# Patient Record
Sex: MALE | Race: WHITE | NOT HISPANIC OR LATINO | URBAN - METROPOLITAN AREA
[De-identification: names, ages, dates, MRNs, and addresses within clinical notes are randomized per-mention and may not be internally consistent; named-entity substitution may affect disease eponyms.]

---

## 2018-08-27 ENCOUNTER — OFFICE VISIT CONVERTED (OUTPATIENT)
Dept: GASTROENTEROLOGY | Facility: CLINIC | Age: 33
End: 2018-08-27
Attending: NURSE PRACTITIONER

## 2020-03-07 ENCOUNTER — HOSPITAL ENCOUNTER (OUTPATIENT)
Dept: OTHER | Facility: HOSPITAL | Age: 35
Discharge: HOME | End: 2020-03-07
Attending: NURSE PRACTITIONER

## 2020-03-07 LAB
CHARACTER SMN: ABNORMAL
COLOR SMN: ABNORMAL
COMPLETE EJACULATE: YES
CONV COLLECTION METHOD: ABNORMAL
Lab: ABNORMAL
Lab: NO
MORPHOLOGY: ABNORMAL
PH UR: 8.5 [PH]
SEX ABSTIN DURATION TIME PATIENT: 5 D
SPECIMEN VOL SMN: 3.5 ML (ref 1.5–5)
SPERM # SMN: 41.6 10*6/ML
SPERM MOTILE NFR SMN: ABNORMAL %
TRANSPORT ISSUES: NO
VISC SMN: ABNORMAL CP

## 2020-08-10 ENCOUNTER — HOSPITAL ENCOUNTER (OUTPATIENT)
Dept: URGENT CARE | Facility: CLINIC | Age: 35
Discharge: HOME | End: 2020-08-10
Attending: PHYSICIAN ASSISTANT

## 2020-08-11 ENCOUNTER — OFFICE VISIT CONVERTED (OUTPATIENT)
Dept: UROLOGY | Facility: CLINIC | Age: 35
End: 2020-08-11
Attending: NURSE PRACTITIONER

## 2020-08-11 ENCOUNTER — HOSPITAL ENCOUNTER (OUTPATIENT)
Dept: SURGERY | Facility: CLINIC | Age: 35
Discharge: HOME | End: 2020-08-11
Attending: NURSE PRACTITIONER

## 2020-08-13 LAB — BACTERIA UR CULT: NORMAL

## 2020-08-14 ENCOUNTER — HOSPITAL ENCOUNTER (OUTPATIENT)
Dept: CT IMAGING | Facility: HOSPITAL | Age: 35
Discharge: HOME | End: 2020-08-14
Attending: NURSE PRACTITIONER

## 2020-08-18 ENCOUNTER — TELEPHONE CONVERTED (OUTPATIENT)
Dept: UROLOGY | Facility: CLINIC | Age: 35
End: 2020-08-18
Attending: NURSE PRACTITIONER

## 2020-09-01 ENCOUNTER — HOSPITAL ENCOUNTER (OUTPATIENT)
Dept: UROLOGY | Facility: CLINIC | Age: 35
Discharge: HOME | End: 2020-09-01
Attending: NURSE PRACTITIONER

## 2020-09-17 LAB
COLOR STONE: NORMAL
COMPN STONE: NORMAL
CONV CA OXALATE DIHYDRATE: 44 %
CONV CA OXALATE MONOHYDRATE: 56 %
CONV CALCULI COMMENT: NORMAL
CONV CALCULI COMMENT: NORMAL
CONV CALCULI DISCLAIMER: NORMAL
CONV CALCULI NOTE: NORMAL
CONV PHOTO (CALCULI): NORMAL
SIZE STONE: NORMAL MM
WT STONE: 19 MG

## 2020-09-19 ENCOUNTER — HOSPITAL ENCOUNTER (OUTPATIENT)
Dept: URGENT CARE | Facility: CLINIC | Age: 35
Discharge: HOME | End: 2020-09-19
Attending: NURSE PRACTITIONER

## 2020-09-19 LAB
ALBUMIN SERPL-MCNC: 4.7 G/DL (ref 3.5–5)
ALBUMIN/GLOB SERPL: 1.9 {RATIO} (ref 1.4–2.6)
ALP SERPL-CCNC: 86 U/L (ref 53–128)
ALT SERPL-CCNC: 20 U/L (ref 10–40)
ANION GAP SERPL CALC-SCNC: 14 MMOL/L (ref 8–19)
AST SERPL-CCNC: 23 U/L (ref 15–50)
BASOPHILS # BLD AUTO: 0.03 10*3/UL (ref 0–0.2)
BASOPHILS NFR BLD AUTO: 0.5 % (ref 0–3)
BILIRUB SERPL-MCNC: 0.69 MG/DL (ref 0.2–1.3)
BUN SERPL-MCNC: 12 MG/DL (ref 5–25)
BUN/CREAT SERPL: 14 {RATIO} (ref 6–20)
CALCIUM SERPL-MCNC: 9.7 MG/DL (ref 8.7–10.4)
CHLORIDE SERPL-SCNC: 102 MMOL/L (ref 99–111)
CONV ABS IMM GRAN: 0.03 10*3/UL (ref 0–0.2)
CONV CO2: 27 MMOL/L (ref 22–32)
CONV IMMATURE GRAN: 0.5 % (ref 0–1.8)
CONV TOTAL PROTEIN: 7.2 G/DL (ref 6.3–8.2)
CREAT UR-MCNC: 0.85 MG/DL (ref 0.7–1.2)
DEPRECATED RDW RBC AUTO: 42 FL (ref 35.1–43.9)
EOSINOPHIL # BLD AUTO: 0.07 10*3/UL (ref 0–0.7)
EOSINOPHIL # BLD AUTO: 1.2 % (ref 0–7)
ERYTHROCYTE [DISTWIDTH] IN BLOOD BY AUTOMATED COUNT: 13 % (ref 11.6–14.4)
GFR SERPLBLD BASED ON 1.73 SQ M-ARVRAT: >60 ML/MIN/{1.73_M2}
GLOBULIN UR ELPH-MCNC: 2.5 G/DL (ref 2–3.5)
GLUCOSE SERPL-MCNC: 87 MG/DL (ref 70–99)
HCT VFR BLD AUTO: 44.6 % (ref 42–52)
HGB BLD-MCNC: 15 G/DL (ref 14–18)
LYMPHOCYTES # BLD AUTO: 2.46 10*3/UL (ref 1–5)
LYMPHOCYTES NFR BLD AUTO: 40.9 % (ref 20–45)
MCH RBC QN AUTO: 29.8 PG (ref 27–31)
MCHC RBC AUTO-ENTMCNC: 33.6 G/DL (ref 33–37)
MCV RBC AUTO: 88.7 FL (ref 80–96)
MONOCYTES # BLD AUTO: 0.44 10*3/UL (ref 0.2–1.2)
MONOCYTES NFR BLD AUTO: 7.3 % (ref 3–10)
NEUTROPHILS # BLD AUTO: 2.99 10*3/UL (ref 2–8)
NEUTROPHILS NFR BLD AUTO: 49.6 % (ref 30–85)
NRBC CBCN: 0 % (ref 0–0.7)
OSMOLALITY SERPL CALC.SUM OF ELEC: 287 MOSM/KG (ref 273–304)
PLATELET # BLD AUTO: 189 10*3/UL (ref 130–400)
PMV BLD AUTO: 10.3 FL (ref 9.4–12.4)
POTASSIUM SERPL-SCNC: 4.1 MMOL/L (ref 3.5–5.3)
RBC # BLD AUTO: 5.03 10*6/UL (ref 4.7–6.1)
SODIUM SERPL-SCNC: 139 MMOL/L (ref 135–147)
WBC # BLD AUTO: 6.02 10*3/UL (ref 4.8–10.8)

## 2020-09-21 ENCOUNTER — OFFICE VISIT CONVERTED (OUTPATIENT)
Dept: UROLOGY | Facility: CLINIC | Age: 35
End: 2020-09-21
Attending: NURSE PRACTITIONER

## 2020-09-21 ENCOUNTER — CONVERSION ENCOUNTER (OUTPATIENT)
Dept: SURGERY | Facility: CLINIC | Age: 35
End: 2020-09-21

## 2020-09-24 ENCOUNTER — PROCEDURE VISIT CONVERTED (OUTPATIENT)
Dept: UROLOGY | Facility: CLINIC | Age: 35
End: 2020-09-24
Attending: UROLOGY

## 2021-01-17 ENCOUNTER — HOSPITAL ENCOUNTER (OUTPATIENT)
Dept: URGENT CARE | Facility: CLINIC | Age: 36
Discharge: HOME | End: 2021-01-17

## 2021-01-19 LAB — BACTERIA SPEC AEROBE CULT: NORMAL

## 2021-01-20 LAB — SARS-COV-2 RNA SPEC QL NAA+PROBE: DETECTED

## 2021-05-10 NOTE — H&P
"   History and Physical      Patient Name: Ernesto Ward   Patient ID: [id number]   Sex: Male   YOB: 1985        Visit Date: August 11, 2020    Provider: SERGEY Jenkins   Location[facility]   Location Address: [address] [place] [address]  [id number]   Location Phone: [phone]          Chief Complaint  · \"I may have blood in my urine\"      History Of Present Illness  The patient is a 35 year old /White male, who is a consultation from [facility], for the evaluation of an episode of gross hematuria. He noticed blood in his urine 2 days ago. The hematuria was constant throughout the stream. It was not associated with any specific activity.     He states the color of his urine during an episode is light red. The patient has no additional complaints. He denies frequency, urgency, dysuria, back pain, fever, chills, nausea, vomiting, and weight loss.     He states that there is no history of recent abdominal or flank trauma. The patient's past medical history is notable for hematuria.     The patient has not been previously evaluated for hematuria.      The patient reports that this occurred when he was 18 and he had a full workup with CT and cystoscopy.           Past Medical History  Disease Name Date Onset Notes   Kidney calculi --  --          Past Surgical History  Procedure Name Date Notes   Tonsils and adenoids biopsy --  --    [place] Tooth Extraction --  --          Allergy List  Allergen Name Date Reaction Notes   NO KNOWN DRUG ALLERGIES --  --  --          Family Medical History  Disease Name Relative/Age Notes   *No Known Family History  --          Social History  Finding Status Start/Stop Quantity Notes   Alcohol Never --/-- --  --    Tobacco Never --/-- --  --          Review of Systems  · Constitutional  o Denies  o : fever, chills  · Eyes  o Denies  o : double vision, cataracts  · HENT  o Denies  o : hearing loss, " "headaches  · Cardiovascular  o Denies  o : chest pain at rest, chest pain with exercise, irregular heart beats, palpitations, leg cramps with exercise  · Respiratory  o Denies  o : shortness of breath, wheezing, sleep apnea  · Gastrointestinal  o Denies  o : heartburn or indigestion, nausea or vomiting, change in abdominal girth, diarrhea, constipation, blood in stools  · Genitourinary  o Admits  o : hematuria  o Denies  o : urgency, frequency, dysuria  · Integument  o Denies  o : rash, new skin lesions  · Neurologic  o Denies  o : memory difficulties, headache, mini-strokes, seizures  · Endocrine  o Denies  o : hot flashes, thyroid disorders  · Psychiatric  o Denies  o : depression, schizophrenia, bipolar disorder  · Heme-Lymph  o Denies  o : easy bleeding, easy bruising, sickle cell disease or trait, lymph node enlargement or tenderness  · Allergic-Immunologic  o Denies  o : immune deficiency, HIV, Hepatitis C      Vitals  Date Time BP Position Site L\R Cuff Size HR RR TEMP (F) WT  HT  BMI kg/m2 BSA m2 O2 Sat        08/11/2020 01:39 PM       15  131lbs 4oz 5'  7\" 20.56 1.68           Physical Examination  · Constitutional  o Appearance  o : Well nourished, well developed patient in no acute distress. Ambulating without difficulty.  · Head and Face  o Head  o :   § Inspection  § : atraumatic, normocephalic  o Face  o :   § Inspection  § : no facial lesions  · Eyes  o Sclerae  o : sclerae white  · Ears, Nose, Mouth and Throat  o Ears  o :   § External Ears  § : appearance within normal limits, no lesions present  o Nose  o :   § External Nose  § : appearance normal  · Neck  o Inspection/Palpation  o : normal appearance, trachea midline  · Respiratory  o Respiratory Effort  o : Breathing is unlabored without accessory muscle use  o Inspection of Chest  o : normal appearance, no retractions  · Skin and Subcutaneous Tissue  o General Inspection  o : No rashes, lesions or areas of discoloration present. Skin turgor is " normal.  o General Palpation  o : No abnormalities, masses or tenderness on palpation.  · Neurologic  o Mental Status Examination  o :   § Orientation  § : grossly oriented to person, place and time  § Speech/Language  § : communication ability within normal limits  o Gait and Station  o : normal gait, able to stand without difficulty  · Psychiatric  o Judgement and Insight  o : judgment and insight intact, judgement for everyday activities and social situations within normal limits, insight intact  o Mood and Affect  o : mood normal, affect appropriate          Results  · In-Office Procedures  o Lab procedure  § Automated dipstick urinalysis with microscopy ([id number])   § Color Ur: Dark yellow   § Clarity Ur: Cloudy   § Glucose Ur Ql Strip: Negative   § Bilirub Ur Ql Strip: Negative   § Ketones Ur Ql Strip: Negative   § Sp Gr Ur Qn: 1.025   § Hgb Ur Ql Strip: Large   § pH Ur-LsCnc: 5.5   § Prot Ur Ql Strip: 30   § Urobilinogen Ur Strip-mCnc: 0.2   § Nitrite Ur Ql Strip: Negative   § WBC Est Ur Ql Strip: Negative   § RBC UrnS Qn HPF: 10-20   § WBC UrnS Qn HPF: 0   § Bacteria UrnS Qn HPF: tntc   § Crystals UrnS Qn HPF: 0   § Epithelial Cells (non renal): 0 /HPF  § Epithelial Cells (renal): 0       Assessment  · Gross hematuria     599.71/R31.0      Plan  · Orders  o Urine culture ([id number], 42882, 13207, 62659) - 599.71/R31.0 - 08/11/2020  o CT Abdomen and Pelvis (with and without Contrast) with Bosniak Class and delayed images ([id number]-[id number]) - 599.71/R31.0 - 08/11/2020  · Medications  o Medications have been Reconciled  o Transition of Care or Provider Policy  · Instructions  o DISCUSSION:  o The patient has documented hematuria. I have discussed the etiologies of hematuria and the options for management and treatment. I have outlined my recommendation for this problem. The patient is in agreement with the plans. The patient is aware that if the workup is not completed, there is a chance that a malignancy may go undetected  and may lead to morbidity or mortality.  o PLAN:  o Schedule cystoscopy  o CT abdomen/pelvis without and with IV contrast            Electronically Signed by: SERGEY Jenkins -Author on August 11, 2020 02:20:25 PM

## 2021-05-13 NOTE — PROGRESS NOTES
"   Quick Note      Patient Name: Ernesto Ward   Patient ID: [id number]   Sex: Male   YOB: 1985        Visit Date: August 18, 2020    Provider: SERGEY Jenkins   Location[facility]   Location Address: [address] [place] [address]  [id number]   Location Phone: [phone]          History Of Present Illness  TELEHEALTH TELEPHONE VISIT  Chief Complaint: CHIEF COMPLAINT   Ernesto Ward is a 35 year old /White male who is presenting for evaluation via telehealth telephone visit. Verbal consent obtained before beginning visit.   Provider spent 8 minutes with the patient during the telehealth visit.   The following staff were present during this visit: Lillie Christianson MA, Felipa RINCON      Called patient to go over results of CT scan.  His CT scan revealed a 3 mm stone in his right distal ureter.    The patient states he has been taking tamsulosin 0.4 mg daily since 4 days ago and started experiencing some mild flank pain last evening.    Educated patient that I do believe this is where his gross hematuria is originating from.    Educated the patient that he will need to strain all urine and bring in his stone for analysis when he passes it.         Vitals  Date Time BP Position Site L\R Cuff Size HR RR TEMP (F) WT  HT  BMI kg/m2 BSA m2 O2 Sat HC       08/18/2020 11:35 AM         130lbs 0oz 5'  6\" 20.98 1.66               Assessment  · Right ureteral calculus     592.1/N20.1      Plan  · Orders  o [facility]ican Telephone evaluation, 5-10 min ([id number]) - 592.1/N20.1 - 08/18/2020  · Instructions  o Plan Of Care: Strain all urine. Bring in past stone for analysis. Patient to follow-up via telephone in approximately 5 weeks. Will evaluate symptoms at that point in time and go over stone pathology should he be able to bring in his renal stone.            Electronically Signed by: SERGEY Jenkins -Author on August 18, 2020 02:38:40 PM  "

## 2021-05-13 NOTE — PROGRESS NOTES
"   Progress Note      Patient Name: Ernesto Ward   Patient ID: [id number]   Sex: Male   YOB: 1985        Visit Date: September 21, 2020    Provider: SERGEY Jenkins   Location: [facility]   Location Address: [address] [place] [address]  [id number]   Location Phone: [phone]          Chief Complaint  · pt here for urologic concerns      History Of Present Illness     35-year-old  male patient presents for follow-up.    Patient's wife had called 9/14/2020 and stated that the patient was complaining of dark-colored urine and thought that he may be passing a stone again.    The patient did not present to an urgent care center when his wife initially called although waited until the 19th of this month to be seen at the urgent care center.    The patient had blood drawn for a CBC and a CMP which were both unremarkable.  However, no urine studies were performed at that point time.    The patient has moderate amount of blood in his urine on urine microscopy today.           Past Medical History  Disease Name Date Onset Notes   Kidney calculi --  --          Past Surgical History  Procedure Name Date Notes   Tonsils and adenoids biopsy --  --    [place] Tooth Extraction --  --          Allergy List  Allergen Name Date Reaction Notes   NO KNOWN DRUG ALLERGIES --  --  --          Family Medical History  Disease Name Relative/Age Notes   *No Known Family History  --          Social History  Finding Status Start/Stop Quantity Notes   Alcohol Never --/-- --  --    Tobacco Never --/-- --  --          Review of Systems  · Constitutional  o Denies  o : fever, chills  · Gastrointestinal  o Denies  o : nausea, vomiting  · Genitourinary  o Admits  o : hematuria  o Denies  o : dysuria      Vitals  Date Time BP Position Site L\R Cuff Size HR RR TEMP (F) WT  HT  BMI kg/m2 BSA m2 O2 Sat HC       09/21/2020 09:03 AM       12  132lbs 2oz 5'  6\" 21.33 1.67           Physical " Examination  · Constitutional  o Appearance  o : well-nourished, well developed, alert, in no acute distress  · Head and Face  o Head  o :   § Inspection  § : atraumatic, normocephalic  o Face  o :   § Inspection  § : no facial lesions  · Eyes  o Sclerae  o : sclerae white  · Ears, Nose, Mouth and Throat  o Ears  o :   § External Ears  § : appearance within normal limits, no lesions present  o Nose  o :   § External Nose  § : appearance normal  · Neck  o Inspection/Palpation  o : normal appearance, trachea midline  · Respiratory  o Respiratory Effort  o : breathing unlabored  o Inspection of Chest  o : normal appearance, no retractions  · Skin and Subcutaneous Tissue  o General Inspection  o : no rashes or lesions present, no lesions present, no areas of discoloration  · Neurologic  o Mental Status Examination  o :   § Orientation  § : grossly oriented to person, place and time  § Speech/Language  § : communication ability within normal limits  o Gait and Station  o : normal gait, able to stand without difficulty  · Psychiatric  o Judgement and Insight  o : judgment and insight intact, judgement for everyday activities and social situations within normal limits, insight intact  o Mood and Affect  o : mood normal, affect appropriate          Results  · In-Office Procedures  o Lab procedure  § Automated dipstick urinalysis with microscopy ([id number])   § Color Ur: Yellow   § Clarity Ur: Clear   § Glucose Ur Ql Strip: Negative   § Bilirub Ur Ql Strip: Negative   § Ketones Ur Ql Strip: Negative   § Sp Gr Ur Qn: 1.025   § Hgb Ur Ql Strip: Moderate   § pH Ur-LsCnc: 5.5   § Prot Ur Ql Strip: Negative   § Urobilinogen Ur Strip-mCnc: 0.2 E.U./dL   § Nitrite Ur Ql Strip: Negative   § WBC Est Ur Ql Strip: Negative   § RBC UrnS Qn HPF: 10-20   § WBC UrnS Qn HPF: 0   § Bacteria UrnS Qn HPF: 0   § Crystals UrnS Qn HPF: 0   § Epithelial Cells (non renal): 0 /HPF  § Epithelial Cells (renal): 0       Assessment  · Gross  hematuria     599.71/R31.0      Plan  · Medications  o Medications have been Reconciled  o Transition of Care or Provider Policy  · Instructions  o Patient to call office if any new or worsening hematuria. He will need to follow-up with Dr. Thuy Gutiérrez for a cystoscopy in office to complete his hematuria workup. Educated patient that I believe that his hematuria is related to passing another kidney stone especially since he has continued microscopic hematuria today. Follow-up with patient in 3 months.  o Electronically Identified Patient Education Materials Provided Electronically            Electronically Signed by: SERGEY Jenkins -Author on September 21, 2020 09:59:14 AM

## 2021-05-14 VITALS — BODY MASS INDEX: 21.23 KG/M2 | WEIGHT: 132.12 LBS | RESPIRATION RATE: 12 BRPM | HEIGHT: 66 IN

## 2021-05-14 VITALS — HEIGHT: 66 IN | WEIGHT: 130 LBS | BODY MASS INDEX: 20.89 KG/M2

## 2021-05-15 VITALS — BODY MASS INDEX: 20.6 KG/M2 | WEIGHT: 131.25 LBS | RESPIRATION RATE: 15 BRPM | HEIGHT: 67 IN

## 2021-05-16 VITALS
DIASTOLIC BLOOD PRESSURE: 70 MMHG | BODY MASS INDEX: 20.09 KG/M2 | SYSTOLIC BLOOD PRESSURE: 114 MMHG | WEIGHT: 125 LBS | TEMPERATURE: 98.8 F | HEIGHT: 66 IN | HEART RATE: 72 BPM

## 2022-04-22 DIAGNOSIS — N20.0 RENAL STONES: Primary | ICD-10-CM

## 2022-05-13 ENCOUNTER — HOSPITAL ENCOUNTER (OUTPATIENT)
Dept: CT IMAGING | Facility: HOSPITAL | Age: 37
Discharge: HOME | End: 2022-05-13
Admitting: UROLOGY

## 2022-05-13 DIAGNOSIS — N20.0 RENAL STONES: ICD-10-CM

## 2022-05-13 PROCEDURE — 74176 CT ABD & PELVIS W/O CONTRAST: CPT

## 2022-05-16 NOTE — PROGRESS NOTES
Please call the patient regarding his CT scan.  He has a few small renal stones on each side but all are small enough that he should pass them.  The largest is 4 mm on right and 3 mm on left.  He is not passing any stones currently.

## 2023-06-04 PROCEDURE — 87086 URINE CULTURE/COLONY COUNT: CPT

## 2023-08-16 ENCOUNTER — TELEPHONE (OUTPATIENT)
Dept: UROLOGY | Facility: CLINIC | Age: 38
End: 2023-08-16
Payer: COMMERCIAL

## 2023-08-16 DIAGNOSIS — R31.0 GROSS HEMATURIA: Primary | ICD-10-CM

## 2023-08-16 NOTE — TELEPHONE ENCOUNTER
Called and spoke with wife and she stated that he is having flank pain but not a whole lot also no nausea vomiting no fever chills flank pain comes and goes.  Does have dark colored urine, patient wife stated that he is a  and can give a urine specimen on Monday.  Dr cervantes stated to obtain a uacx, to see if with an infection then can get a ct scan renal protocol.  Wife stated understanding.

## 2023-08-16 NOTE — TELEPHONE ENCOUNTER
Caller: DEANN HAWTHORNE     Relationship: SPOUSE    Best call back number: [phone]    Patient is needing: INCOMING CALL FROM PT'S SPOUSE. ASKING TO GET PT IN TO BE SEEN ON MONDAY OR SOMETIME NEXT WEEK. SAYS THAT HE IS PEEING BLOOD AGAIN AND IT IS WORSENING. PT SPOUSE SAYS HE HAD BLOOD IN HIS URINE 2 WEEKS AGO, SAID IT GOT BETTER, BUT THEN MORE BLOOD HAS SHOWED UP SINCE LAST NIGHT, SAYS IT LOOKS LIKE COKE SODA WATER.

## 2023-08-18 ENCOUNTER — LAB (OUTPATIENT)
Dept: LAB | Facility: HOSPITAL | Age: 38
End: 2023-08-18
Payer: COMMERCIAL

## 2023-08-18 DIAGNOSIS — R31.0 GROSS HEMATURIA: ICD-10-CM

## 2023-08-18 PROCEDURE — 87086 URINE CULTURE/COLONY COUNT: CPT

## 2023-08-18 PROCEDURE — 81001 URINALYSIS AUTO W/SCOPE: CPT

## 2023-08-19 LAB
BACTERIA UR QL AUTO: ABNORMAL /HPF
BILIRUB UR QL STRIP: NEGATIVE
CLARITY UR: CLEAR
COLOR UR: YELLOW
GLUCOSE UR STRIP-MCNC: NEGATIVE MG/DL
HGB UR QL STRIP.AUTO: ABNORMAL
HYALINE CASTS UR QL AUTO: ABNORMAL /LPF
KETONES UR QL STRIP: ABNORMAL
LEUKOCYTE ESTERASE UR QL STRIP.AUTO: NEGATIVE
NITRITE UR QL STRIP: NEGATIVE
PH UR STRIP.AUTO: 6.5 [PH] (ref 5–8)
PROT UR QL STRIP: NEGATIVE
RBC # UR STRIP: ABNORMAL /HPF
REF LAB TEST METHOD: ABNORMAL
SP GR UR STRIP: 1.02 (ref 1–1.03)
SQUAMOUS #/AREA URNS HPF: ABNORMAL /HPF
UROBILINOGEN UR QL STRIP: ABNORMAL
WBC # UR STRIP: ABNORMAL /HPF

## 2023-08-20 LAB — BACTERIA SPEC AEROBE CULT: NO GROWTH

## 2023-08-28 ENCOUNTER — SPECIMEN COLLECTION (OUTPATIENT)
Dept: UROLOGY | Facility: CLINIC | Age: 38
End: 2023-08-28
Payer: COMMERCIAL

## 2023-08-28 ENCOUNTER — TELEPHONE (OUTPATIENT)
Dept: UROLOGY | Facility: CLINIC | Age: 38
End: 2023-08-28
Payer: COMMERCIAL

## 2023-08-28 DIAGNOSIS — N20.0 RENAL STONES: Primary | ICD-10-CM

## 2023-08-28 DIAGNOSIS — N20.0 RENAL STONES: ICD-10-CM

## 2023-08-28 PROCEDURE — 88300 SURGICAL PATH GROSS: CPT | Performed by: UROLOGY

## 2023-08-28 PROCEDURE — 82365 CALCULUS SPECTROSCOPY: CPT | Performed by: UROLOGY

## 2023-08-28 NOTE — TELEPHONE ENCOUNTER
----- Message from Thuy Gutiérrez MD sent at 8/26/2023  3:23 PM EDT -----  Urine culture negative; CT scan without acute findings; can put in for cysto and go over CT result; thanks

## 2023-08-28 NOTE — TELEPHONE ENCOUNTER
SPOKE TO PT AND RELAYED MESSAGE. OFFERED APPT FOR 9/1 FOR CYSTO, AND TO DISCUSS CT. PT DECLINED. SAID THAT HE PASSED A KIDNEY STONE OVER THE WEEKEND AND HE IS FEELING BETTER. HE DID COLLECT THE STONE AND WOULD LIKE TO HAVE IT SENT OFF FOR ANALYSIS. ADVISED PT TO BRING STONE IN AND WE WILL SEND IT TO THE LAB. PT EXPRESSED UNDERSTANDING AND IS AGREEABLE. LAB ORDER IN.

## 2023-08-30 LAB
LAB AP CASE REPORT: NORMAL
LAB AP CLINICAL INFORMATION: NORMAL
PATH REPORT.FINAL DX SPEC: NORMAL
PATH REPORT.GROSS SPEC: NORMAL

## 2023-09-11 LAB
CALCIUM OXALATE DIHYDRATE MFR STONE IR: 60 %
COLOR STONE: NORMAL
COM MFR STONE: 40 %
COMPN STONE: NORMAL
LABORATORY COMMENT REPORT: NORMAL
Lab: NORMAL
Lab: NORMAL
PHOTO: NORMAL
SIZE STONE: NORMAL MM
SPEC SOURCE SUBJ: NORMAL
WT STONE: 27 MG

## 2024-11-26 ENCOUNTER — OFFICE VISIT (OUTPATIENT)
Dept: INTERNAL MEDICINE | Facility: CLINIC | Age: 39
End: 2024-11-26
Payer: COMMERCIAL

## 2024-11-26 VITALS
HEIGHT: 66 IN | TEMPERATURE: 97.4 F | SYSTOLIC BLOOD PRESSURE: 122 MMHG | OXYGEN SATURATION: 96 % | DIASTOLIC BLOOD PRESSURE: 80 MMHG | WEIGHT: 143.6 LBS | HEART RATE: 87 BPM | BODY MASS INDEX: 23.08 KG/M2 | RESPIRATION RATE: 16 BRPM

## 2024-11-26 DIAGNOSIS — R14.0 BLOATING: ICD-10-CM

## 2024-11-26 DIAGNOSIS — Z13.220 LIPID SCREENING: ICD-10-CM

## 2024-11-26 DIAGNOSIS — Z11.59 ENCOUNTER FOR HEPATITIS C SCREENING TEST FOR LOW RISK PATIENT: ICD-10-CM

## 2024-11-26 DIAGNOSIS — Z23 NEEDS FLU SHOT: ICD-10-CM

## 2024-11-26 DIAGNOSIS — Z76.89 ESTABLISHING CARE WITH NEW DOCTOR, ENCOUNTER FOR: ICD-10-CM

## 2024-11-26 DIAGNOSIS — R50.9 FEVER, UNSPECIFIED FEVER CAUSE: Primary | ICD-10-CM

## 2024-11-26 DIAGNOSIS — Z00.00 ANNUAL PHYSICAL EXAM: ICD-10-CM

## 2024-11-26 DIAGNOSIS — Z13.29 THYROID DISORDER SCREEN: ICD-10-CM

## 2024-11-26 PROBLEM — N20.0 KIDNEY CALCULI: Status: ACTIVE | Noted: 2024-11-26

## 2024-11-26 LAB
EXPIRATION DATE: NORMAL
FLUAV AG UPPER RESP QL IA.RAPID: NOT DETECTED
FLUBV AG UPPER RESP QL IA.RAPID: NOT DETECTED
INTERNAL CONTROL: NORMAL
Lab: NORMAL
SARS-COV-2 AG UPPER RESP QL IA.RAPID: NOT DETECTED

## 2024-11-26 PROCEDURE — 99395 PREV VISIT EST AGE 18-39: CPT | Performed by: NURSE PRACTITIONER

## 2024-11-26 PROCEDURE — 99213 OFFICE O/P EST LOW 20 MIN: CPT | Performed by: NURSE PRACTITIONER

## 2024-11-26 PROCEDURE — 87428 SARSCOV & INF VIR A&B AG IA: CPT | Performed by: NURSE PRACTITIONER

## 2024-11-26 PROCEDURE — 90471 IMMUNIZATION ADMIN: CPT | Performed by: NURSE PRACTITIONER

## 2024-11-26 PROCEDURE — 90656 IIV3 VACC NO PRSV 0.5 ML IM: CPT | Performed by: NURSE PRACTITIONER

## 2024-11-26 NOTE — ASSESSMENT & PLAN NOTE
Screening labs reviewed/ordered  Counseling provided regarding age appropriate screenings and immunizations, healthy diet and exercise.     Patient was recommended to come back in a few weeks when feeling better in order to get his labs completed.

## 2024-11-26 NOTE — PROGRESS NOTES
"Chief Complaint  Establish Care (Establish care with new provider )    Subjective        Ernesto Ward presents [facility] for establishment of care.  Patient reports he has not had a primary care provider in several years.  He splits his time between [place] and [place], feels like he probably spends more time in [place], so felt it would be more important to have a primary care provider here.  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  He denies any significant past medical history other than kidney stones, which she has been seen for when they occur.  He did have an acute concern regarding a rash on his neck, which caused him to go ahead and make his first appointment, however the rash has completely resolved at this point.  He is having some acute symptoms today though, some abdominal bloating, fever, chills, cough, congestion.  Would like to be tested since we are close to the holidays.    Objective   Vital Signs:   /80 (BP Location: Left arm, Patient Position: Sitting, Cuff Size: Adult)   Pulse 87   Temp 97.4 °F (36.3 °C) (Temporal)   Resp 16   Ht 167.6 cm (65.98\")   Wt 65.1 kg (143 lb 9.6 oz)   SpO2 96%   BMI 23.19 kg/m²     Physical Exam  Vitals and nursing note reviewed.   Constitutional:       Appearance: Normal appearance. He is normal weight.   HENT:      Head: Normocephalic and atraumatic.      Right Ear: Tympanic membrane, ear canal and external ear normal.      Left Ear: Tympanic membrane, ear canal and external ear normal.      Nose: Nose normal.      Mouth/Throat:      Mouth: Mucous membranes are moist.      Pharynx: Oropharynx is clear.   Eyes:      Conjunctiva/sclera: Conjunctivae normal.      Pupils: Pupils are equal, round, and reactive to light.   Cardiovascular:      Rate and Rhythm: Normal rate and regular rhythm.   Pulmonary:      Effort: Pulmonary effort is normal.      Breath sounds: Normal " breath sounds.      Comments: Cough on exam  Neurological:      Mental Status: He is alert.   Psychiatric:         Mood and Affect: Mood normal.         Thought Content: Thought content normal.        Result Review :  {The following data was reviewed by SERGEY Valverde on 11/26/24                Diagnoses and all orders for this visit:    1. Fever, unspecified fever cause (Primary)  -     POCT SARS-CoV-2 + Flu Antigen MARVA    2. Establishing care with new doctor, encounter for    3. Annual physical exam  Assessment & Plan:  Screening labs reviewed/ordered  Counseling provided regarding age appropriate screenings and immunizations, healthy diet and exercise.     Patient was recommended to come back in a few weeks when feeling better in order to get his labs completed.    Orders:  -     Comprehensive Metabolic Panel  -     CBC & Differential  -     TSH  -     Lipid Panel    4. Needs flu shot  -     Fluzone >6mos ([id number])    5. Lipid screening  -     Lipid Panel    6. Thyroid disorder screen  -     TSH    7. Encounter for hepatitis C screening test for low risk patient  -     Hepatitis C Antibody    8. Bloating    Testing in office today did not reveal COVID and/or flu, discussed appropriate medications to use as needed.  Can follow-up otherwise as needed.    Recommended labs, but to come in at a different time to have these completed, would not recommend getting while ill.      Follow Up   Return in about 3 weeks (around 12/17/2024) for Nurse visit for labs.  Patient was given instructions and counseling regarding his condition or for health maintenance advice. Please see specific information pulled into the AVS if appropriate.     SERGEY Valverde  11/26/2024  This note was electronically signed.